# Patient Record
Sex: FEMALE | Race: WHITE | HISPANIC OR LATINO | Employment: FULL TIME | URBAN - METROPOLITAN AREA
[De-identification: names, ages, dates, MRNs, and addresses within clinical notes are randomized per-mention and may not be internally consistent; named-entity substitution may affect disease eponyms.]

---

## 2018-04-02 ENCOUNTER — HOSPITAL ENCOUNTER (OUTPATIENT)
Dept: RADIOLOGY | Facility: MEDICAL CENTER | Age: 23
End: 2018-04-02
Attending: NURSE PRACTITIONER
Payer: COMMERCIAL

## 2018-04-02 ENCOUNTER — OFFICE VISIT (OUTPATIENT)
Dept: URGENT CARE | Facility: CLINIC | Age: 23
End: 2018-04-02
Payer: COMMERCIAL

## 2018-04-02 VITALS
RESPIRATION RATE: 16 BRPM | BODY MASS INDEX: 24.61 KG/M2 | OXYGEN SATURATION: 99 % | TEMPERATURE: 98.6 F | DIASTOLIC BLOOD PRESSURE: 78 MMHG | HEART RATE: 92 BPM | SYSTOLIC BLOOD PRESSURE: 108 MMHG | WEIGHT: 147.71 LBS | HEIGHT: 65 IN

## 2018-04-02 DIAGNOSIS — R22.1 LUMP ON NECK: ICD-10-CM

## 2018-04-02 DIAGNOSIS — K04.7 DENTAL INFECTION: ICD-10-CM

## 2018-04-02 LAB
INT CON NEG: NEGATIVE
INT CON POS: POSITIVE
S PYO AG THROAT QL: NORMAL

## 2018-04-02 PROCEDURE — 76536 US EXAM OF HEAD AND NECK: CPT

## 2018-04-02 PROCEDURE — 99204 OFFICE O/P NEW MOD 45 MIN: CPT | Mod: 25 | Performed by: NURSE PRACTITIONER

## 2018-04-02 PROCEDURE — 87880 STREP A ASSAY W/OPTIC: CPT | Performed by: NURSE PRACTITIONER

## 2018-04-02 RX ORDER — CLINDAMYCIN HYDROCHLORIDE 300 MG/1
300 CAPSULE ORAL 3 TIMES DAILY
Qty: 30 CAP | Refills: 0 | Status: SHIPPED | OUTPATIENT
Start: 2018-04-02 | End: 2018-04-12

## 2018-04-02 NOTE — PROGRESS NOTES
"Chief Complaint   Patient presents with   • Lump     neck   • Facial Swelling     jaw area,L hurts more x 3 days       HISTORY OF PRESENT ILLNESS: Patient is a 23 y.o. female who presents today due to complaints of a facial swelling, oral pain, and a sore throat for the past three days. Reports associated fever, chills, pain with swallowing. Denies associated congestion, ear pain, cough, or difficulty breathing. She feels her symptoms started with pain to bilateral lower posterior molars and then developed associated symptoms. She has tried OTC medications at home without much improvement. Denies known ill contacts. She does not have a dentist. In addition, she is concerned about a lump to her neck. the lump is central and anteriorly located, near thyroid. She has had the lump present for the past 1.5 years but seems to be \"moving\" and growing recently. She mentioned this to her GYN recently and they ordered blood work for her thyroid and referred her to an endocrinologist. She was told her blood work returned normal. She has an appointment with her endocrinologist in two days. She has not had any radiology studies done. She does not have a PCP.       There are no active problems to display for this patient.      Allergies:Penicillins    No current Epic-ordered outpatient prescriptions on file.     No current [employer]-ordered facility-administered medications on file.        History reviewed. No pertinent past medical history.    Social History   Substance Use Topics   • Smoking status: Not on file   • Smokeless tobacco: Not on file   • Alcohol use Not on file       No family status information on file.   History reviewed. No pertinent family history.    ROS:  Review of Systems   Constitutional: Positive for fever, chills, malaise. Negative for weight loss and fatigue.   HENT: Positive for sore throat, oral pain, neck lumpt. Negative for ear pain, nosebleeds, congestion.   Eyes: Negative for vision changes. " "  Cardiovascular: Negative for chest pain, palpitations, orthopnea and leg swelling.   Respiratory: Negative for cough, sputum production, shortness of breath and wheezing.   Gastrointestinal: Negative for abdominal pain, nausea, vomiting or diarrhea.   Skin: Negative for rash, diaphoresis.     Exam:  Blood pressure 108/78, pulse 92, temperature 37 °C (98.6 °F), resp. rate 16, height 1.651 m (5' 5\"), weight 67 kg (147 lb 11.3 oz), last menstrual period 02/08/2018, SpO2 99 %, not currently breastfeeding.  General: well-nourished, well-developed female in NAD  Head: normocephalic, atraumatic, no obvious swelling noted. No facial erythema.   Eyes: PERRLA, no conjunctival injection, acuity grossly intact, lids normal.  Ears: normal shape and symmetry, no tenderness, no discharge. External canals are without any significant edema or erythema. Tympanic membranes are without any inflammation, no effusion. Gross auditory acuity is intact.  Nose: symmetrical without tenderness, no discharge.  Mouth/Throat: reasonable hygiene. There is pharyngeal erythema, without exudates and tonsillar enlargement present. There are skin flaps, which are erythematous, to bilateral lower posterior molars, tender, without drainage. No abscesses noted.   Neck: range of motion within normal limits, no tracheal deviation. The is thyroid enlargement noted, non tender.   Lymph: bilateral anterior cervical adenopathy. No supraclavicular adenopathy.   Neuro: alert and oriented. Cranial nerves 1-12 grossly intact. No sensory deficit.   Cardiovascular: regular rate and rhythm. No edema.  Pulmonary: no distress. Chest is symmetrical with respiration, no wheezes, crackles, or rhonchi.   Musculoskeletal: no clubbing, appropriate muscle tone, gait is stable.  Skin: warm, dry, intact, no clubbing, no cyanosis, no rashes.   Psych: appropriate mood, affect, judgement.         Assessment/Plan:  1. Dental infection  POCT Rapid Strep A    US-SOFT TISSUES OF HEAD " "- NECK    clindamycin (CLEOCIN) 300 MG Cap   2. Lump on neck  US-SOFT TISSUES OF HEAD - NECK           POC strep negative      Antibiotic as directed for suspected dental infection, probiotic use strongly encouraged. Follow up with a dentist this week. OTC motrin or tylenol for pain/fever control. Hand hygiene. Increase fluid intake, rest. Warm salt water gargles.   An US is ordered for her lump, will call with results. Follow up with endocrinologist as planned.   Supportive care, differential diagnoses, and indications for immediate follow-up discussed with patient.   Pathogenesis of diagnosis discussed including typical length and natural progression.   Instructed to return to clinic or nearest emergency department for any change in condition, further concerns, or worsening of symptoms.  Patient states understanding of the plan of care and discharge instructions.  Instructed to make an appointment, to establish care and for follow up, with a primary care provider.        Please note that this dictation was created using voice recognition software. I have made every reasonable attempt to correct obvious errors, but I expect that there are errors of grammar and possibly content that I did not discover before finalizing the note.      IZABELA Ramon.         Addendum 4/2/18: US radiology reading \"1.  The patient's palpable mass corresponds to a hypoechoic solid mass with multiple punctate calcifications within the superior neck at the midline measuring 2.5 x 1.8 x 1.6 cm in size. 2.  Enlarged cervical lymph nodes bilaterally.\" Patient called with results.       Rebeca Grier A.P.R.N.      "

## 2018-04-04 ENCOUNTER — OFFICE VISIT (OUTPATIENT)
Dept: ENDOCRINOLOGY | Facility: MEDICAL CENTER | Age: 23
End: 2018-04-04
Payer: COMMERCIAL

## 2018-04-04 VITALS
BODY MASS INDEX: 23.99 KG/M2 | OXYGEN SATURATION: 97 % | WEIGHT: 144 LBS | HEIGHT: 65 IN | HEART RATE: 101 BPM | SYSTOLIC BLOOD PRESSURE: 116 MMHG | DIASTOLIC BLOOD PRESSURE: 74 MMHG

## 2018-04-04 DIAGNOSIS — E55.9 VITAMIN D DEFICIENCY: ICD-10-CM

## 2018-04-04 DIAGNOSIS — E28.2 PCOS (POLYCYSTIC OVARIAN SYNDROME): ICD-10-CM

## 2018-04-04 DIAGNOSIS — E04.1 THYROID NODULE: ICD-10-CM

## 2018-04-04 DIAGNOSIS — E53.8 VITAMIN B12 DEFICIENCY: ICD-10-CM

## 2018-04-04 PROCEDURE — 99205 OFFICE O/P NEW HI 60 MIN: CPT | Performed by: INTERNAL MEDICINE

## 2018-04-04 RX ORDER — HYDROCODONE BITARTRATE AND ACETAMINOPHEN 5; 300 MG/1; MG/1
5-300 TABLET ORAL
COMMUNITY
End: 2018-04-12

## 2018-04-04 NOTE — PROGRESS NOTES
New Patient Consult Note  Primary care physician: Pcp Pt States None    Reason for consult:  Solitary thyroid nodule    HPI:  Kath Kessler is a 23 y.o. old patient who comes in today for evaluation of solitary thyroid nodule with calcifications. Since last Friday she is been having some sort of upper respiratory tract infection along with throat swelling sore throat and mouth ulcers. Also things that she has some enlarged lymph nodes around the facial area especially around the submandibular and around the cricoid cartilage area.  She also has some problems of enlarged tonsils for long period of time now.    This no family history of thyroid cancer. She denies any history of exposure to radiation to head and neck region.  She does complain of feeling fatigued and tired at all times. She also has irregular menstrual cycle. (Menstruation cycle happens once every 40 days.) She also complains of facial hirsutism.    ROS:  Constitutional: enlarged lymph nodes, sore throat, mouth ulcers, and large tonsils, No weight loss  Cardiac: No palpitations or racing heart  Resp: No shortness of breath  Neuro: No numbness or tinging in feet  Endo: No heat or cold intolerance, no polyuria or polydipsia  All other systems were reviewed and were negative.    Past Medical History:  There are no active problems to display for this patient.      Past Surgical History:  History reviewed. No pertinent surgical history.    Allergies:  Penicillins    Social History:  Social History     Social History   • Marital status: Single     Spouse name: N/A   • Number of children: N/A   • Years of education: N/A     Occupational History   • Not on file.     Social History Main Topics   • Smoking status: Never Smoker   • Smokeless tobacco: Never Used   • Alcohol use Yes      Comment: 1 a month   • Drug use: No   • Sexual activity: Yes     Partners: Male     Other Topics Concern   • Not on file     Social History Narrative   • No narrative on file  "      Family History:  Family History   Problem Relation Age of Onset   • Diabetes Father        Medications:    Current Outpatient Prescriptions:   •  Hydrocodone-Acetaminophen (VICODIN) 5-300 MG Tab, Take 5-300 mg of opioid by mouth 3 times a day., Disp: , Rfl:   •  clindamycin (CLEOCIN) 300 MG Cap, Take 1 Cap by mouth 3 times a day for 10 days., Disp: 30 Cap, Rfl: 0    Labs: Reviewed    Physical Examination:  Vital signs: /74   Pulse (!) 101   Ht 1.651 m (5' 5\")   Wt 65.3 kg (144 lb)   SpO2 97%   BMI 23.96 kg/m²  Body mass index is 23.96 kg/m².  General: No apparent distress, cooperative, mild facial hirsutism.  Eyes: No scleral icterus or discharge  ENMT: Normal on external inspection of nose, lips, normal thyroid exam(unable to palpate this 2.5 cm thyroid nodule as it seems it's right in the middle of the thyroid)  Neck: No abnormal masses on inspection  Resp: Normal effort, clear to auscultation bilaterally   CVS: Regular rate and rhythm, S1 S2 normal, no murmur   Extremities: No edema  Abdomen:  No abdominal obesity present  Neuro: Alert and oriented  Skin: No rash  Psych: Normal mood and affect, intact memory and able to make informed decisions    Assessment and Plan:    1. Thyroid nodule  Recommend FNAC of the dominant nodule with punctate calcifications to rule out thyroid cancer.   - IR-NEEDLE BX-THYROID; Future    2. Vitamin D deficiency  Rule out Vitamin D deficiency as the contributory factor for fatigue.     - VITAMIN D,25 HYDROXY; Future    3. Vitamin B12 deficiency  Rule out Vitamin B12 deficiency as the contributory factor for fatigue.  - VITAMIN B12; Future    4. PCOS (polycystic ovarian syndrome)  Will do the following work up for PCOS:  - FSH, SERUM  - ESTRADIOL; Future  - LUTEINIZING HORMONE SERUM  - 17-OH PROGESTERONE; Future  - ANDROSTENEDIONE; Future  - DHEA; Future  - DHEA SULFATE; Future      Return in about 1 month (around 5/4/2018).    Total face to face time spent with " patient equals 60 minutes. 35/60 minutes were spent on counseling the patient about the pathophysiology of pituitary-thyroid axis, pituitary-gonadal axis, natural history of thyroid nodules, pathophysiology of enlarged lymph nodes; treatments of thyroid cancer if FNAC of thyroid nodule is positive for cancer, side effects and benefits of Vit D and Vit B12 replacement.    Thank you for allowing me to participate in the care of this patient.    Tadeo Palomo M.D.  04/04/18    CC:   Pcp Pt States None    This note was created using voice recognition software (Dragon). The accuracy of the dictation is limited by the abilities of the software. I have reviewed the note prior to signing, however some errors in grammar and context are still possible. If you have any questions related to this note please do not hesitate to contact our office.

## 2018-04-04 NOTE — LETTER
2018        Kath Victoria  : 1995    To whomsoever it may concern    She was at my office today for her clinic appointment. Let me know if you have questions or concerns.     Best,       Tadeo Palomo

## 2018-04-05 ENCOUNTER — OFFICE VISIT (OUTPATIENT)
Dept: MEDICAL GROUP | Facility: MEDICAL CENTER | Age: 23
End: 2018-04-05
Payer: COMMERCIAL

## 2018-04-05 ENCOUNTER — HOSPITAL ENCOUNTER (OUTPATIENT)
Dept: RADIOLOGY | Facility: MEDICAL CENTER | Age: 23
End: 2018-04-05
Attending: INTERNAL MEDICINE
Payer: COMMERCIAL

## 2018-04-05 ENCOUNTER — HOSPITAL ENCOUNTER (OUTPATIENT)
Facility: MEDICAL CENTER | Age: 23
End: 2018-04-05
Attending: PHYSICIAN ASSISTANT
Payer: COMMERCIAL

## 2018-04-05 VITALS
DIASTOLIC BLOOD PRESSURE: 72 MMHG | BODY MASS INDEX: 23.82 KG/M2 | HEIGHT: 65 IN | SYSTOLIC BLOOD PRESSURE: 118 MMHG | TEMPERATURE: 99.2 F | HEART RATE: 100 BPM | OXYGEN SATURATION: 97 % | WEIGHT: 143 LBS

## 2018-04-05 DIAGNOSIS — K13.70 MOUTH LESION: ICD-10-CM

## 2018-04-05 DIAGNOSIS — E04.1 THYROID NODULE: ICD-10-CM

## 2018-04-05 PROCEDURE — 87529 HSV DNA AMP PROBE: CPT

## 2018-04-05 PROCEDURE — 99214 OFFICE O/P EST MOD 30 MIN: CPT | Performed by: PHYSICIAN ASSISTANT

## 2018-04-05 PROCEDURE — 88112 CYTOPATH CELL ENHANCE TECH: CPT

## 2018-04-05 PROCEDURE — 10022 IR-NEEDLE BX-THYROID: CPT

## 2018-04-05 RX ORDER — TRAMADOL HYDROCHLORIDE 50 MG/1
50 TABLET ORAL EVERY 8 HOURS PRN
Qty: 15 TAB | Refills: 0 | Status: SHIPPED
Start: 2018-04-05 | End: 2018-04-12

## 2018-04-05 ASSESSMENT — PATIENT HEALTH QUESTIONNAIRE - PHQ9: CLINICAL INTERPRETATION OF PHQ2 SCORE: 0

## 2018-04-05 NOTE — PROGRESS NOTES
"Patient given [facility] \"Preventing the Spread of Infection\" Brochure upon being checked in.     US guided midline mass nodule fine needle aspiration done by Dr. Diana; mid anterior aspect of neck access site; procedural RN: Esther; 1 jar of cytolyt and 1 RPMI obtained and sent to pathology lab; pt tolerated the procedure well; pt remained stable pre/intra/post procedure; all questions and concerns answered prior to being d/c; patient provided with appropriate education for procedure; pt d/c home.     "

## 2018-04-05 NOTE — LETTER
April 5, 2018         Patient: Kath Kessler   YOB: 1995   Date of Visit: 4/5/2018           To Whom it May Concern:    Kath Kessler was seen in my clinic on 4/5/2018. She may return to work on 4/9/18.    If you have any questions or concerns, please don't hesitate to call.        Sincerely,           Maribell Mercado P.A.-C.  Electronically Signed

## 2018-04-05 NOTE — PROGRESS NOTES
"Chief Complaint   Patient presents with   • Establish Care     General Check up   • Mass     In throat   • Mouth Lesions     Throat, tonsils & cheeks, sore tongue, swollen cheeks, only can eat food puree       [place]  Kath Kessler is a 23 y.o. female here today for new onset mouth sores, sore throat, swollen tongue and pain in throat, cheeks and tongue X 7 days. Pt started w a swollen lymph node on L side on the neck, next day she got a canker sore on R side of the tongue. Then she started having swollen and painful tongue, sore throat and she noticed she has inflamed gums covering her wisdom teeth. She also has white spots over her tonsils. Patient was seen in urgent care with negative deep instruments test and is currently taking clindamycin which has helped with swollen lymph nodes. States her tonsils has decreased in size. Continues to the mastoids and pains. Denies any recent oral sex. Has been sexually active with the same partner for the past 6 years. No fevers or chills. It is painful to chew or swallow. No prior history of HSV or cold sores. Patient was seen by a dentist and was assured that her teeth are not infected. She was given Norco 5 for pain. She has been taking 3 pills a day. Also has been taking ibuprofen without any helped pain.        Past medical, surgical, family, and social history is reviewed in Epic chart by me today.   Medications and allergies reviewed and updated in Epic chart by me today.     ROS:   As documented in history of present illness above    Exam:  Blood pressure 118/72, pulse 100, temperature 37.3 °C (99.2 °F), height 1.651 m (5' 5\"), weight 64.9 kg (143 lb), last menstrual period 03/08/2018, SpO2 97 %, not currently breastfeeding.  Constitutional: Alert, no distress, plus 3 vital signs  Skin:  Warm, dry, no rashes invisible areas  ENMT: Swollen gums, one sore over right side of the tongue, another one behind the upper lip,  multiple small sores over tonsils.  Neck: Trachea " midline, no masses, no thyromegaly  Psych: Alert, pleasant, well-groomed, normal affect    A/P:    1. Mouth lesion  HSV 1/2 BY PCR(HERPES)    HSV I/II IGG & IGM SERUM    CBC WITH DIFFERENTIAL    tramadol (ULTRAM) 50 MG Tab    maalox plus-benadryl-visc lidocaine (MAGIC MOUTHWASH)    NON SPECIFIED    DISCONTINUED: maalox plus-benadryl-visc lidocaine (MAGIC MOUTHWASH)     F/u next wk

## 2018-04-06 DIAGNOSIS — K13.70 MOUTH LESION: ICD-10-CM

## 2018-04-07 LAB
HSV1+2 DNA SPEC QL NAA+PROBE: ABNORMAL
SIGNIFICANT IND 70042: ABNORMAL
SITE SITE: ABNORMAL
SOURCE SOURCE: ABNORMAL

## 2018-04-12 ENCOUNTER — OFFICE VISIT (OUTPATIENT)
Dept: MEDICAL GROUP | Facility: MEDICAL CENTER | Age: 23
End: 2018-04-12
Payer: COMMERCIAL

## 2018-04-12 VITALS
TEMPERATURE: 98.9 F | SYSTOLIC BLOOD PRESSURE: 110 MMHG | RESPIRATION RATE: 16 BRPM | WEIGHT: 141.4 LBS | BODY MASS INDEX: 23.56 KG/M2 | HEIGHT: 65 IN | OXYGEN SATURATION: 100 % | DIASTOLIC BLOOD PRESSURE: 70 MMHG | HEART RATE: 85 BPM

## 2018-04-12 DIAGNOSIS — R22.1 NECK NODULE: ICD-10-CM

## 2018-04-12 DIAGNOSIS — B00.9 HERPES INFECTION: ICD-10-CM

## 2018-04-12 PROBLEM — E04.1 THYROID NODULE: Status: ACTIVE | Noted: 2018-04-12

## 2018-04-12 PROCEDURE — 99213 OFFICE O/P EST LOW 20 MIN: CPT | Performed by: PHYSICIAN ASSISTANT

## 2018-04-12 NOTE — ASSESSMENT & PLAN NOTE
Ultrasound guided FNA without good results. Not a good sample. Will f/u with endocrinology in May. Not getting worse. Does cause discomfort like a sensation that she has been talking too long. No change in voice. No difficulty breathing or swallowing.

## 2018-04-12 NOTE — PROGRESS NOTES
"Subjective:   Kath Kessler is a 23 y.o. female here today for follow up on two concerns    Neck nodule  Ultrasound guided FNA without good results. Not a good sample. Will f/u with endocrinology in May. Not getting worse. Does cause discomfort like a sensation that she has been talking too long. No change in voice. No difficulty breathing or swallowing.    Herpes infection  Lesions have now resolved. About 8-10 days of painful mouth lesions, swollen tonsils. Culture positive for HSV-1. Per patient no hx of similar. No known sick contacts. Currently no pain.         Current medicines (including changes today)  No current outpatient prescriptions on file.     No current facility-administered medications for this visit.      She  has a past medical history of Allergy and Thyroid disease.    ROS   No fever/chills. No weight change. No headache/dizziness. No focal weakness. No sore throat, nasal congestion, ear pain. No chest pain, no shortness of breath, difficulty breathing. No n/v/d/c or abdominal pain. No urinary complaint. No rash or skin lesion. No joint pain or swelling.       Objective:     Blood pressure 110/70, pulse 85, temperature 37.2 °C (98.9 °F), resp. rate 16, height 1.651 m (5' 5\"), weight 64.1 kg (141 lb 6.4 oz), SpO2 100 %. Body mass index is 23.53 kg/m².   Physical Exam:  Constitutional: WDWN, NAD  Skin: Warm, dry, good turgor, no rashes in visible areas.  Eye: Equal, round and reactive, conjunctiva clear, lids normal.  ENMT: Lips without lesions, good dentition, oropharynx clear.  Psych: Alert and oriented x3, normal affect and mood.        Assessment and Plan:   The following treatment plan was discussed    1. Herpes infection  Discussed likely prognosis, plan if symptoms recur    2. Neck nodule  Discussed f/u with endocrine, consider ENT for second opinion      Followup: Return if symptoms worsen or fail to improve.         "

## 2018-04-12 NOTE — ASSESSMENT & PLAN NOTE
Lesions have now resolved. About 8-10 days of painful mouth lesions, swollen tonsils. Culture positive for HSV-1. Per patient no hx of similar. No known sick contacts. Currently no pain.

## 2018-04-20 DIAGNOSIS — E04.2 GOITER, NONTOXIC, MULTINODULAR: ICD-10-CM

## 2018-05-01 ENCOUNTER — HOSPITAL ENCOUNTER (OUTPATIENT)
Dept: RADIOLOGY | Facility: MEDICAL CENTER | Age: 23
End: 2018-05-01
Attending: INTERNAL MEDICINE
Payer: COMMERCIAL

## 2018-05-01 DIAGNOSIS — E04.2 GOITER, NONTOXIC, MULTINODULAR: ICD-10-CM

## 2018-05-01 PROCEDURE — 21550 BIOPSY OF NECK/CHEST: CPT

## 2018-05-01 PROCEDURE — 87116 MYCOBACTERIA CULTURE: CPT

## 2018-05-01 PROCEDURE — 38505 NEEDLE BIOPSY LYMPH NODES: CPT

## 2018-05-01 PROCEDURE — 87102 FUNGUS ISOLATION CULTURE: CPT

## 2018-05-01 PROCEDURE — 87206 SMEAR FLUORESCENT/ACID STAI: CPT

## 2018-05-01 PROCEDURE — 88112 CYTOPATH CELL ENHANCE TECH: CPT

## 2018-05-01 PROCEDURE — 88305 TISSUE EXAM BY PATHOLOGIST: CPT

## 2018-05-01 PROCEDURE — 87205 SMEAR GRAM STAIN: CPT

## 2018-05-01 PROCEDURE — 87070 CULTURE OTHR SPECIMN AEROBIC: CPT

## 2018-05-01 PROCEDURE — 88342 IMHCHEM/IMCYTCHM 1ST ANTB: CPT

## 2018-05-01 PROCEDURE — 88341 IMHCHEM/IMCYTCHM EA ADD ANTB: CPT

## 2018-05-01 NOTE — PROGRESS NOTES
"Patient given [facility] \"Preventing the Spread of Infection\" Brochure upon being checked in.     US guided mid neck mass core biopsy done by Dr. Adan; mid anterior aspect of neck access site; procedural RN: Fermin; 1 jar of cytolyt, formalin with cores x 4, and 1 vial of sterile saline sent for cultures obtained and sent to pathology lab; pt tolerated the procedure well; pt remained stable pre/intra/post procedure; all questions and concerns answered prior to being d/c; patient provided with appropriate education for procedure; pt d/c home.     "

## 2018-05-02 LAB
GRAM STN SPEC: NORMAL
SIGNIFICANT IND 70042: NORMAL
SITE SITE: NORMAL
SOURCE SOURCE: NORMAL

## 2018-05-03 LAB
RHODAMINE-AURAMINE STN SPEC: NORMAL
SIGNIFICANT IND 70042: NORMAL
SITE SITE: NORMAL
SOURCE SOURCE: NORMAL

## 2018-05-04 LAB
BACTERIA TISS AEROBE CULT: NORMAL
GRAM STN SPEC: NORMAL
SIGNIFICANT IND 70042: NORMAL
SITE SITE: NORMAL
SOURCE SOURCE: NORMAL

## 2018-05-09 ENCOUNTER — HOSPITAL ENCOUNTER (OUTPATIENT)
Dept: LAB | Facility: MEDICAL CENTER | Age: 23
End: 2018-05-09
Attending: PHYSICIAN ASSISTANT
Payer: COMMERCIAL

## 2018-05-09 ENCOUNTER — HOSPITAL ENCOUNTER (OUTPATIENT)
Dept: LAB | Facility: MEDICAL CENTER | Age: 23
End: 2018-05-09
Attending: INTERNAL MEDICINE
Payer: COMMERCIAL

## 2018-05-22 ENCOUNTER — HOSPITAL ENCOUNTER (OUTPATIENT)
Dept: LAB | Facility: MEDICAL CENTER | Age: 23
End: 2018-05-22
Attending: INTERNAL MEDICINE
Payer: COMMERCIAL

## 2018-05-22 ENCOUNTER — HOSPITAL ENCOUNTER (OUTPATIENT)
Dept: LAB | Facility: MEDICAL CENTER | Age: 23
End: 2018-05-22
Attending: PHYSICIAN ASSISTANT
Payer: COMMERCIAL

## 2018-05-22 DIAGNOSIS — K13.70 MOUTH LESION: ICD-10-CM

## 2018-05-22 DIAGNOSIS — E55.9 VITAMIN D DEFICIENCY: ICD-10-CM

## 2018-05-22 DIAGNOSIS — E53.8 VITAMIN B12 DEFICIENCY: ICD-10-CM

## 2018-05-22 DIAGNOSIS — E28.2 PCOS (POLYCYSTIC OVARIAN SYNDROME): ICD-10-CM

## 2018-05-22 LAB
25(OH)D3 SERPL-MCNC: 18 NG/ML (ref 30–100)
BASOPHILS # BLD AUTO: 1 % (ref 0–1.8)
BASOPHILS # BLD: 0.08 K/UL (ref 0–0.12)
DHEA-S SERPL-MCNC: 287.3 UG/DL (ref 98.8–340)
EOSINOPHIL # BLD AUTO: 0.41 K/UL (ref 0–0.51)
EOSINOPHIL NFR BLD: 5.3 % (ref 0–6.9)
ERYTHROCYTE [DISTWIDTH] IN BLOOD BY AUTOMATED COUNT: 46.1 FL (ref 35.9–50)
ESTRADIOL SERPL-MCNC: 47 PG/ML
HCT VFR BLD AUTO: 43.1 % (ref 37–47)
HGB BLD-MCNC: 14.8 G/DL (ref 12–16)
IMM GRANULOCYTES # BLD AUTO: 0.02 K/UL (ref 0–0.11)
IMM GRANULOCYTES NFR BLD AUTO: 0.3 % (ref 0–0.9)
LYMPHOCYTES # BLD AUTO: 3.33 K/UL (ref 1–4.8)
LYMPHOCYTES NFR BLD: 43.4 % (ref 22–41)
MCH RBC QN AUTO: 33.2 PG (ref 27–33)
MCHC RBC AUTO-ENTMCNC: 34.3 G/DL (ref 33.6–35)
MCV RBC AUTO: 96.6 FL (ref 81.4–97.8)
MONOCYTES # BLD AUTO: 0.42 K/UL (ref 0–0.85)
MONOCYTES NFR BLD AUTO: 5.5 % (ref 0–13.4)
NEUTROPHILS # BLD AUTO: 3.42 K/UL (ref 2–7.15)
NEUTROPHILS NFR BLD: 44.5 % (ref 44–72)
NRBC # BLD AUTO: 0 K/UL
NRBC BLD-RTO: 0 /100 WBC
PLATELET # BLD AUTO: 221 K/UL (ref 164–446)
PMV BLD AUTO: 12 FL (ref 9–12.9)
RBC # BLD AUTO: 4.46 M/UL (ref 4.2–5.4)
VIT B12 SERPL-MCNC: 238 PG/ML (ref 211–911)
WBC # BLD AUTO: 7.7 K/UL (ref 4.8–10.8)

## 2018-05-22 PROCEDURE — 82627 DEHYDROEPIANDROSTERONE: CPT

## 2018-05-22 PROCEDURE — 82607 VITAMIN B-12: CPT

## 2018-05-22 PROCEDURE — 82157 ASSAY OF ANDROSTENEDIONE: CPT

## 2018-05-22 PROCEDURE — 86694 HERPES SIMPLEX NES ANTBDY: CPT

## 2018-05-22 PROCEDURE — 36415 COLL VENOUS BLD VENIPUNCTURE: CPT

## 2018-05-22 PROCEDURE — 82670 ASSAY OF TOTAL ESTRADIOL: CPT

## 2018-05-22 PROCEDURE — 85025 COMPLETE CBC W/AUTO DIFF WBC: CPT

## 2018-05-22 PROCEDURE — 82626 DEHYDROEPIANDROSTERONE: CPT

## 2018-05-22 PROCEDURE — 83498 ASY HYDROXYPROGESTERONE 17-D: CPT

## 2018-05-22 PROCEDURE — 82306 VITAMIN D 25 HYDROXY: CPT

## 2018-05-24 LAB
17OHP SERPL-MCNC: 40.6 NG/DL
ANDROST SERPL-MCNC: 1.64 NG/ML (ref 0.26–2.14)
DHEA SERPL-MCNC: 9.95 NG/ML (ref 1.33–7.78)
HSV1+2 IGG SER IA-ACNC: 12.7 IV
HSV1+2 IGM SER IA-ACNC: 2.41 IV

## 2018-05-29 ENCOUNTER — OFFICE VISIT (OUTPATIENT)
Dept: ENDOCRINOLOGY | Facility: MEDICAL CENTER | Age: 23
End: 2018-05-29
Payer: COMMERCIAL

## 2018-05-29 VITALS
SYSTOLIC BLOOD PRESSURE: 107 MMHG | HEART RATE: 74 BPM | DIASTOLIC BLOOD PRESSURE: 60 MMHG | WEIGHT: 147 LBS | HEIGHT: 65 IN | BODY MASS INDEX: 24.49 KG/M2 | OXYGEN SATURATION: 98 %

## 2018-05-29 DIAGNOSIS — E04.2 GOITER, NONTOXIC, MULTINODULAR: ICD-10-CM

## 2018-05-29 DIAGNOSIS — E55.9 VITAMIN D DEFICIENCY: ICD-10-CM

## 2018-05-29 DIAGNOSIS — R53.83 FATIGUE, UNSPECIFIED TYPE: ICD-10-CM

## 2018-05-29 DIAGNOSIS — E53.8 VITAMIN B12 DEFICIENCY: ICD-10-CM

## 2018-05-29 DIAGNOSIS — E28.2 PCOS (POLYCYSTIC OVARIAN SYNDROME): ICD-10-CM

## 2018-05-29 PROCEDURE — 99214 OFFICE O/P EST MOD 30 MIN: CPT | Mod: 25 | Performed by: INTERNAL MEDICINE

## 2018-05-29 PROCEDURE — 96372 THER/PROPH/DIAG INJ SC/IM: CPT | Performed by: INTERNAL MEDICINE

## 2018-05-29 RX ORDER — CYANOCOBALAMIN 1000 UG/ML
1000 INJECTION, SOLUTION INTRAMUSCULAR; SUBCUTANEOUS
OUTPATIENT
Start: 2018-05-29

## 2018-05-29 RX ORDER — ERGOCALCIFEROL 1.25 MG/1
50000 CAPSULE ORAL
Qty: 18 CAP | Refills: 0 | Status: SHIPPED | OUTPATIENT
Start: 2018-05-29 | End: 2018-07-24

## 2018-05-29 RX ADMIN — CYANOCOBALAMIN 1000 MCG: 1000 INJECTION, SOLUTION INTRAMUSCULAR; SUBCUTANEOUS at 11:37

## 2018-05-29 NOTE — PROGRESS NOTES
"Endocrinology Clinic Progress Note  PCP: Maribell Mercado P.A.-C.    HPI:  Kath Kessler is a 23 y.o. old patient who comes in today for review of endocrine problems.    Goiter, nontoxic, multinodular:  FNAC was negative for cancer.  Vitamin D deficiency:  Currently not taking Vitamin D supplementation. Vitamin D level on 5/22/18 was 18  Vitamin B12 deficiency:  Currently not taking Vitamin B 12 supplementation.  Vitamin B 12 level on 5/22/18 was 238.  She does complain of feeling fatigued and tired at all times. She also complains of muscle cramps.   PCOS (polycystic ovarian syndrome):  She now has regular menstrual cycle with menstruation cycle every 34 days. She also complains of facial hirsutism.    ROS:  Constitutional: Fatigue, muscle cramps, No unintentional weight loss  Endo: Denies excessive thirst or frequent urination  All other systems were reviewed and were negative.    Past Medical History:  Patient Active Problem List    Diagnosis Date Noted   • Herpes infection 04/12/2018   • Neck nodule 04/12/2018       Medications:    Current Outpatient Prescriptions:   •  vitamin D, Ergocalciferol, (DRISDOL) 58957 units Cap capsule, Take 1 Cap by mouth every 3 days for 56 days., Disp: 18 Cap, Rfl: 0    Current Facility-Administered Medications:   •  cyanocobalamin (VITAMIN B-12) injection 1,000 mcg, 1,000 mcg, Intramuscular, Q14 DAYS, Tadeo Palomo M.D.    Labs: Reviewed    Physical Examination:  Vital signs: /60   Pulse 74   Ht 1.651 m (5' 5\")   Wt 66.7 kg (147 lb)   SpO2 98%   BMI 24.46 kg/m²  Body mass index is 24.46 kg/m². Patient's body mass index is 24.46 kg/m². Exercise and nutrition counseling were performed at this visit.  General: No apparent distress, cooperative  Eyes: No scleral icterus, no discharge, normal eyelids  Neck: No abnormal masses on inspection, normal thyroid exam  Resp: Normal effort, clear to auscultation bilaterally  CVS: Regular rate and rhythm, S1 S2 " normal, no murmur  Extremities: No lower extremity edema  Abdomen: abdominal obesity present  Musculoskeletal: Normal digits and nails  Skin: No rash on visible skin  Psych: Alert and oriented, normal mood and affect, intact memory and able to make informed decisions.    Assessment and Plan:    1. Goiter, nontoxic, multinodular  s/p FNAC  Which is negative for cancer.     2. Vitamin D deficiency  Vitamin D deficiency could be contributing to fatigue. Recommend 50,000 units one capsule every 3 days with food for 2 months followed by 5000 units daily as the maintenance dose.    3. Vitamin B12 deficiency  1000 µg intramuscular B 12 injections every 2 weeks for the next 3 months followed by sublingual B12 5000 µg daily.  First dose of intramuscular B12 injection given in the office today.    4. PCOS (polycystic ovarian syndrome)  DHEA slightly high. Recommend low-dose estrogen progesterone to help with the facial hirsutism. If that does not suffice will consider adding spironolactone.    Return in about 3 months (around 8/29/2018).    Thank you for allowing me to participate in the care of this patient.    Tadeo Palomo M.D.    CC:   Maribell Mercado P.A.-C.    This note was created using voice recognition software (Dragon). The accuracy of the dictation is limited by the abilities of the software. I have reviewed the note prior to signing, however some errors in grammar and context are still possible. If you have any questions related to this note please do not hesitate to contact our office.

## 2018-05-30 LAB
FUNGUS SPEC CULT: NORMAL
SIGNIFICANT IND 70042: NORMAL
SITE SITE: NORMAL
SOURCE SOURCE: NORMAL

## 2018-06-21 ENCOUNTER — NON-PROVIDER VISIT (OUTPATIENT)
Dept: ENDOCRINOLOGY | Facility: MEDICAL CENTER | Age: 23
End: 2018-06-21
Payer: COMMERCIAL

## 2018-06-21 DIAGNOSIS — E53.8 B12 DEFICIENCY: ICD-10-CM

## 2018-06-21 PROCEDURE — 96372 THER/PROPH/DIAG INJ SC/IM: CPT | Performed by: INTERNAL MEDICINE

## 2018-06-21 RX ADMIN — CYANOCOBALAMIN 1000 MCG: 1000 INJECTION, SOLUTION INTRAMUSCULAR; SUBCUTANEOUS at 13:35

## 2018-06-26 LAB
MYCOBACTERIUM SPEC CULT: NORMAL
RHODAMINE-AURAMINE STN SPEC: NORMAL
SIGNIFICANT IND 70042: NORMAL
SITE SITE: NORMAL
SOURCE SOURCE: NORMAL

## 2018-07-05 ENCOUNTER — APPOINTMENT (OUTPATIENT)
Dept: ENDOCRINOLOGY | Facility: MEDICAL CENTER | Age: 23
End: 2018-07-05
Payer: COMMERCIAL

## 2018-07-18 ENCOUNTER — APPOINTMENT (OUTPATIENT)
Dept: ENDOCRINOLOGY | Facility: MEDICAL CENTER | Age: 23
End: 2018-07-18
Payer: COMMERCIAL

## 2018-08-15 ENCOUNTER — APPOINTMENT (OUTPATIENT)
Dept: ENDOCRINOLOGY | Facility: MEDICAL CENTER | Age: 23
End: 2018-08-15
Payer: COMMERCIAL

## 2018-08-29 ENCOUNTER — APPOINTMENT (OUTPATIENT)
Dept: ENDOCRINOLOGY | Facility: MEDICAL CENTER | Age: 23
End: 2018-08-29
Payer: COMMERCIAL

## 2020-11-21 ENCOUNTER — HOSPITAL ENCOUNTER (EMERGENCY)
Dept: HOSPITAL 8 - ED | Age: 25
Discharge: HOME | End: 2020-11-21
Payer: COMMERCIAL

## 2020-11-21 VITALS — DIASTOLIC BLOOD PRESSURE: 98 MMHG | SYSTOLIC BLOOD PRESSURE: 120 MMHG

## 2020-11-21 VITALS — WEIGHT: 147.49 LBS | BODY MASS INDEX: 24.57 KG/M2 | HEIGHT: 65 IN

## 2020-11-21 DIAGNOSIS — J06.9: Primary | ICD-10-CM

## 2020-11-21 DIAGNOSIS — R19.7: ICD-10-CM

## 2020-11-21 DIAGNOSIS — R51.9: ICD-10-CM

## 2020-11-21 PROCEDURE — 99283 EMERGENCY DEPT VISIT LOW MDM: CPT

## 2021-10-28 ENCOUNTER — HOSPITAL ENCOUNTER (OUTPATIENT)
Dept: RADIOLOGY | Facility: MEDICAL CENTER | Age: 26
End: 2021-10-28
Attending: NURSE PRACTITIONER
Payer: COMMERCIAL

## 2021-10-28 DIAGNOSIS — R22.1 LOCALIZED SWELLING, MASS AND LUMP, NECK: ICD-10-CM

## 2021-10-28 PROCEDURE — 76536 US EXAM OF HEAD AND NECK: CPT

## 2022-01-22 ENCOUNTER — HOSPITAL ENCOUNTER (OUTPATIENT)
Facility: MEDICAL CENTER | Age: 27
End: 2022-01-22
Attending: PHYSICIAN ASSISTANT
Payer: COMMERCIAL

## 2022-01-22 ENCOUNTER — OFFICE VISIT (OUTPATIENT)
Dept: URGENT CARE | Facility: CLINIC | Age: 27
End: 2022-01-22
Payer: COMMERCIAL

## 2022-01-22 VITALS
HEART RATE: 90 BPM | BODY MASS INDEX: 24.99 KG/M2 | TEMPERATURE: 98.7 F | RESPIRATION RATE: 18 BRPM | OXYGEN SATURATION: 98 % | DIASTOLIC BLOOD PRESSURE: 80 MMHG | HEIGHT: 65 IN | SYSTOLIC BLOOD PRESSURE: 120 MMHG | WEIGHT: 150 LBS

## 2022-01-22 DIAGNOSIS — Z20.822 SUSPECTED COVID-19 VIRUS INFECTION: ICD-10-CM

## 2022-01-22 PROCEDURE — U0005 INFEC AGEN DETEC AMPLI PROBE: HCPCS

## 2022-01-22 PROCEDURE — U0003 INFECTIOUS AGENT DETECTION BY NUCLEIC ACID (DNA OR RNA); SEVERE ACUTE RESPIRATORY SYNDROME CORONAVIRUS 2 (SARS-COV-2) (CORONAVIRUS DISEASE [COVID-19]), AMPLIFIED PROBE TECHNIQUE, MAKING USE OF HIGH THROUGHPUT TECHNOLOGIES AS DESCRIBED BY CMS-2020-01-R: HCPCS

## 2022-01-23 DIAGNOSIS — Z20.822 SUSPECTED COVID-19 VIRUS INFECTION: ICD-10-CM

## 2022-01-23 LAB — COVID ORDER STATUS COVID19: NORMAL

## 2022-01-24 LAB
SARS-COV-2 RNA RESP QL NAA+PROBE: DETECTED
SPECIMEN SOURCE: ABNORMAL

## 2023-03-01 ENCOUNTER — OFFICE VISIT (OUTPATIENT)
Dept: URGENT CARE | Facility: CLINIC | Age: 28
End: 2023-03-01
Payer: COMMERCIAL

## 2023-03-01 VITALS
WEIGHT: 155 LBS | DIASTOLIC BLOOD PRESSURE: 68 MMHG | BODY MASS INDEX: 25.83 KG/M2 | SYSTOLIC BLOOD PRESSURE: 104 MMHG | RESPIRATION RATE: 16 BRPM | HEART RATE: 102 BPM | HEIGHT: 65 IN | OXYGEN SATURATION: 98 % | TEMPERATURE: 98 F

## 2023-03-01 DIAGNOSIS — J06.9 UPPER RESPIRATORY TRACT INFECTION, UNSPECIFIED TYPE: ICD-10-CM

## 2023-03-01 PROCEDURE — 99213 OFFICE O/P EST LOW 20 MIN: CPT | Performed by: PHYSICIAN ASSISTANT

## 2023-03-01 ASSESSMENT — ENCOUNTER SYMPTOMS
NAUSEA: 0
SPUTUM PRODUCTION: 0
DIARRHEA: 0
CHILLS: 0
VOMITING: 0
SINUS PAIN: 0
ABDOMINAL PAIN: 0
MYALGIAS: 0
SHORTNESS OF BREATH: 0
RHINORRHEA: 1
SORE THROAT: 0
HEADACHES: 0
FEVER: 0
COUGH: 1
WHEEZING: 0

## 2023-03-01 NOTE — PROGRESS NOTES
"Subjective     Kath Kessler is a 27 y.o. female who presents with Cough (Cough , stuffy nose x 5 days )            Cough  This is a new problem. Episode onset: 5 days. The problem has been gradually improving. The cough is Non-productive. Associated symptoms include nasal congestion and rhinorrhea (yellow mucous). Pertinent negatives include no chest pain, chills, ear pain, fever, headaches, myalgias, postnasal drip, rash, sore throat, shortness of breath or wheezing. Nothing aggravates the symptoms. She has tried OTC cough suppressant for the symptoms. The treatment provided mild relief.     Patient would like a day to rest. She tried going to work this morning but felt awful. She is requesting a Doctor's note.      Past Medical History:   Diagnosis Date    Allergy     Thyroid disease          No past surgical history on file.        Family History   Problem Relation Age of Onset    Diabetes Father        Allergies:  Amoxicillin and Penicillins      Medications, Allergies, and current problem list reviewed today in Epic    Review of Systems   Constitutional:  Positive for malaise/fatigue. Negative for chills and fever.   HENT:  Positive for congestion and rhinorrhea (yellow mucous). Negative for ear discharge, ear pain, postnasal drip, sinus pain and sore throat.    Respiratory:  Positive for cough. Negative for sputum production, shortness of breath and wheezing.    Cardiovascular:  Negative for chest pain.   Gastrointestinal:  Negative for abdominal pain, diarrhea, nausea and vomiting.   Musculoskeletal:  Negative for myalgias.   Skin:  Negative for rash.   Neurological:  Negative for headaches.        All other systems reviewed and are negative.       Objective     /68 (BP Location: Left arm, Patient Position: Sitting, BP Cuff Size: Adult)   Pulse (!) 102   Temp 36.7 °C (98 °F) (Temporal)   Resp 16   Ht 1.651 m (5' 5\")   Wt 70.3 kg (155 lb)   SpO2 98%   BMI 25.79 kg/m²      Physical " Exam  Constitutional:       General: She is not in acute distress.     Appearance: She is not ill-appearing.   HENT:      Head: Normocephalic and atraumatic.      Right Ear: Tympanic membrane, ear canal and external ear normal.      Left Ear: Tympanic membrane, ear canal and external ear normal.      Nose: Congestion and rhinorrhea present.      Mouth/Throat:      Mouth: Mucous membranes are moist.      Pharynx: No posterior oropharyngeal erythema.   Eyes:      Conjunctiva/sclera: Conjunctivae normal.   Cardiovascular:      Rate and Rhythm: Normal rate and regular rhythm.      Heart sounds: Normal heart sounds.   Pulmonary:      Effort: Pulmonary effort is normal. No respiratory distress.      Breath sounds: Normal breath sounds. No wheezing, rhonchi or rales.   Skin:     General: Skin is warm and dry.   Neurological:      General: No focal deficit present.      Mental Status: She is alert and oriented to person, place, and time.   Psychiatric:         Mood and Affect: Mood normal.         Behavior: Behavior normal.         Thought Content: Thought content normal.         Judgment: Judgment normal.                           Assessment & Plan        1. Upper respiratory tract infection, unspecified type  Viral etiology discussed. Continue conservative treatment.        Differential diagnoses, Supportive care, and indications for immediate follow-up discussed with patient.   Pathogenesis of diagnosis discussed including typical length and natural progression.   Instructed to return to clinic or nearest emergency department for any change in condition, further concerns, or worsening of symptoms.        The patient demonstrated a good understanding and agreed with the treatment plan.      Birgit Brown P.A.-C.

## 2023-03-01 NOTE — LETTER
March 1, 2023         Patient: Kath Kessler   YOB: 1995   Date of Visit: 3/1/2023           To Whom it May Concern:    Kath Kessler was seen in my clinic on 3/1/2023. She should be excused from work today for medical reasons.     If you have any questions or concerns, please don't hesitate to call.        Sincerely,           Birgit Brown P.A.-C.  Electronically Signed

## 2024-05-25 NOTE — LETTER
Balloon removed intact. [facility]  Maribell Mercado P.A.-C.  [address]  [place] [address]  Fax: [phone]   Authorization for Release/Disclosure of   Protected Health Information   Name: PETER KESSLER : 1995 SSN: [id number]   Address: [address] [place] [address]. [address]  [place] [address] Phone:    [phone] (home)    I authorize the entity listed below to release/disclose the PHI below to:   [facility]/Maribell Mercado P.A.-C. and Maribell Mercado P.A.-C.   Provider or Entity Name: Dr. Batista   City, [facility], [facility]   Phone:      Fax:     Reason for request: continuity of care   Information to be released:    [  ] LAST COLONOSCOPY,  including any PATH REPORT and follow-up  [  ] LAST FIT/COLOGUARD RESULT [  ] LAST DEXA  [  ] LAST MAMMOGRAM  [x  ] LAST PAP  [  ] LAST LABS [  ] RETINA EXAM REPORT  [  ] IMMUNIZATION RECORDS  [  ] Release all info      [  ] Check here and initial the line next to each item to release ALL health information INCLUDING  _____ Care and treatment for drug and / or alcohol abuse  _____ HIV testing, infection status, or AIDS  _____ Genetic Testing    DATES OF SERVICE OR TIME PERIOD TO BE DISCLOSED: _____________  I understand and acknowledge that:  * This Authorization may be revoked at any time by you in writing, except if your health information has already been used or disclosed.  * Your health information that will be used or disclosed as a result of you signing this authorization could be re-disclosed by the recipient. If this occurs, your re-disclosed health information may no longer be protected by State or Federal laws.  * You may refuse to sign this Authorization. Your refusal will not affect your ability to obtain treatment.  * This Authorization becomes effective upon signing and will  on (date) __________.      If no date is indicated, this Authorization will  one (1) year from the signature date.    Name: Peter Kessler    Signature:   Date:     2018       PLEASE FAX REQUESTED RECORDS BACK TO: [phone]